# Patient Record
Sex: MALE
[De-identification: names, ages, dates, MRNs, and addresses within clinical notes are randomized per-mention and may not be internally consistent; named-entity substitution may affect disease eponyms.]

---

## 2024-01-31 ENCOUNTER — APPOINTMENT (OUTPATIENT)
Dept: ORTHOPEDIC SURGERY | Facility: CLINIC | Age: 74
End: 2024-01-31
Payer: MEDICARE

## 2024-01-31 VITALS — BODY MASS INDEX: 22.76 KG/M2 | WEIGHT: 145 LBS | HEIGHT: 67 IN

## 2024-01-31 DIAGNOSIS — E78.00 PURE HYPERCHOLESTEROLEMIA, UNSPECIFIED: ICD-10-CM

## 2024-01-31 DIAGNOSIS — M62.830 MUSCLE SPASM OF BACK: ICD-10-CM

## 2024-01-31 DIAGNOSIS — Z78.9 OTHER SPECIFIED HEALTH STATUS: ICD-10-CM

## 2024-01-31 DIAGNOSIS — I51.9 HEART DISEASE, UNSPECIFIED: ICD-10-CM

## 2024-01-31 DIAGNOSIS — M47.818 SPONDYLOSIS W/OUT MYELOPATHY OR RADICULOPATHY, SACRAL AND SACROCOCCYGEAL REGION: ICD-10-CM

## 2024-01-31 PROBLEM — Z00.00 ENCOUNTER FOR PREVENTIVE HEALTH EXAMINATION: Status: ACTIVE | Noted: 2024-01-31

## 2024-01-31 PROCEDURE — 72170 X-RAY EXAM OF PELVIS: CPT

## 2024-01-31 PROCEDURE — 99204 OFFICE O/P NEW MOD 45 MIN: CPT

## 2024-01-31 PROCEDURE — 72100 X-RAY EXAM L-S SPINE 2/3 VWS: CPT

## 2024-01-31 RX ORDER — ASPIRIN/ACETAMINOPHEN/CAFFEINE 500-325-65
325 POWDER IN PACKET (EA) ORAL
Refills: 0 | Status: ACTIVE | COMMUNITY

## 2024-01-31 RX ORDER — LORAZEPAM 1 MG/1
1 TABLET ORAL
Refills: 0 | Status: ACTIVE | COMMUNITY

## 2024-01-31 RX ORDER — METOPROLOL SUCCINATE 50 MG/1
50 TABLET, EXTENDED RELEASE ORAL
Refills: 0 | Status: ACTIVE | COMMUNITY

## 2024-01-31 RX ORDER — CLOZAPINE 100 MG/1
100 TABLET ORAL
Refills: 0 | Status: ACTIVE | COMMUNITY

## 2024-01-31 RX ORDER — ROSUVASTATIN CALCIUM 40 MG/1
40 TABLET, FILM COATED ORAL
Refills: 0 | Status: ACTIVE | COMMUNITY

## 2024-01-31 RX ORDER — VILAZODONE HYDROCHLORIDE 10 MG/1
10 TABLET ORAL
Refills: 0 | Status: ACTIVE | COMMUNITY

## 2024-01-31 RX ORDER — DICLOFENAC SODIUM 1% 10 MG/G
1 GEL TOPICAL
Qty: 1 | Refills: 0 | Status: ACTIVE | COMMUNITY
Start: 2024-01-31 | End: 1900-01-01

## 2024-01-31 NOTE — DISCUSSION/SUMMARY
[de-identified] : Discussed options, Start Physical Therapy voltaren gel f/u 6 weeks ----------------------------------------------------------------------------  All relevant imaging studies pertinent to today's visit, including x-rays, MRI's and/or other advanced imaging studies (CT/etc) were independently interpreted and reviewed with the patient as needed. Implications of the studies together with the patient's clinical picture were discussed to formulate a working diagnosis and management options were detailed.  The patient was advised of the diagnosis.  The natural history of the pathology was explained in full. All questions were answered.  The risks and benefits of conservative and interventional treatment alternatives were explained to the patient

## 2024-01-31 NOTE — IMAGING
[Disc space narrowing] : Disc space narrowing [Scoliosis] : Scoliosis [Spondylolysis] : Spondylolysis [AP] : anteroposterior [Mild arthritis (Tonnis Grade 1)] : Mild arthritis ([place] Grade 1) [de-identified] :   ----------------------------------------------------------------------------   Thoracic/Lumbar spine exam:   Inspection:    (neg) Abnormal alignment (kyphosis/lordosis/scoliosis)   (neg) Atrophy ROM:    Pain:           (+) Flexion/extension     (+) Rotation    Stiffness:   (+) Flexion/extension     (+) Rotation                       (neg) Hamstring tightness Tenderness/Spasm:              Lumbar paraspinal:          (neg) Right    (neg) Left    (neg) Midline    Thoracic paraspinal:        (neg) Right    (neg) Left    (neg) Midline    PSIS:                                (neg) Right    (neg) Left    SI joint:                             (neg) Right    (neg) Left    Greater troch:                  (neg) Right    (neg) Left Strength: (out of 5)    Illiopsoas:           Right: 4  .    Left: 4   limited by discomfort    Quad:                 Right: 5   .    Left: 5    Hamstrings:        Right: 5   .    Left: 5    Anterior tibialis:  Right: 5    .   Left: 5    Gastrocsoleus:  Right: 5    .   Left: 5    EHL:                    Right: 5    .   Left: 5 Neuro: DTR's wnl.  Sensation to light touch grossly in tact in all distributions.    (neg) SLR    (neg) Femoral stretch Vascularity: Extremity warm and well perfused Gait: antalgic    [FreeTextEntry1] : mild ddd [de-identified] : SI joint arthritis

## 2024-01-31 NOTE — HISTORY OF PRESENT ILLNESS
[Sudden] : sudden [7] : 7 [Sharp] : sharp [Frequent] : frequent [de-identified] : This is Mr. LESLY VALENZUELA  a 73 year old male who comes in today complaining of bilateral hip pain since 1/30/24 with no injury.  pain is bl low back, localized around SI joints. no radiation of pain. no radic sx. has had an occasional n/t in the L foot. pain with walking.   h/o CABG  On aspirin 325mg [] : no [de-identified] : sitting to standing/lifting legs